# Patient Record
Sex: MALE | Race: WHITE | HISPANIC OR LATINO | Employment: FULL TIME | URBAN - METROPOLITAN AREA
[De-identification: names, ages, dates, MRNs, and addresses within clinical notes are randomized per-mention and may not be internally consistent; named-entity substitution may affect disease eponyms.]

---

## 2019-09-27 ENCOUNTER — OFFICE VISIT (OUTPATIENT)
Dept: URGENT CARE | Facility: PHYSICIAN GROUP | Age: 24
End: 2019-09-27

## 2019-09-27 VITALS
HEART RATE: 59 BPM | RESPIRATION RATE: 16 BRPM | TEMPERATURE: 98 F | WEIGHT: 179.4 LBS | OXYGEN SATURATION: 100 % | SYSTOLIC BLOOD PRESSURE: 120 MMHG | DIASTOLIC BLOOD PRESSURE: 80 MMHG | BODY MASS INDEX: 24.3 KG/M2 | HEIGHT: 72 IN

## 2019-09-27 DIAGNOSIS — S86.911A STRAIN OF RIGHT KNEE, INITIAL ENCOUNTER: ICD-10-CM

## 2019-09-27 PROCEDURE — 99204 OFFICE O/P NEW MOD 45 MIN: CPT | Performed by: PHYSICIAN ASSISTANT

## 2019-09-27 ASSESSMENT — ENCOUNTER SYMPTOMS
EYE REDNESS: 0
SORE THROAT: 0
COUGH: 0
SHORTNESS OF BREATH: 0
FEVER: 0
ABDOMINAL PAIN: 0
EYE DISCHARGE: 0
NAUSEA: 0
HEADACHES: 0
VOMITING: 0

## 2019-09-27 NOTE — PROGRESS NOTES
Subjective:      Leonard Quiroga V is a 24 y.o. male who presents with Knee Pain (R knee, swelling, pt twisted it and heard a pop, painful to twist/turn x4 days )        Knee Pain   This is a new problem. Episode onset: x 4 days. The problem occurs constantly. The problem has been unchanged. Associated symptoms include joint swelling. Pertinent negatives include no abdominal pain, chest pain, congestion, coughing, fever, headaches, nausea, numbness, rash, sore throat, vomiting or weakness. The symptoms are aggravated by walking. He has tried nothing for the symptoms.     The patient presents to clinic c/o right knee pain x 4 days. The patient states he twisted his right knee. The patient heard a pop at the time of injury. The patient reports continued discomfort to his right knee. He also reports mild swelling. No bruising. The patient reports decreased ROM secondary to pain, stating he is unable to fully extend his right knee. The patient also reports increased pain with walking, stating his knee feels unstable. The patient reports a history of patella dislocation. The patient denies numbness, tingling or weakness to his right lower extremity. The patient has not taken anything for his symptoms.     PMH:  has no past medical history on file.  MEDS: No current outpatient medications on file.  ALLERGIES: No Known Allergies  SURGHX: History reviewed. No pertinent surgical history.  SOCHX:  reports that he has never smoked. He has never used smokeless tobacco.  FH: Family history was reviewed, no pertinent findings to report      Review of Systems   Constitutional: Negative for fever.   HENT: Negative for congestion, ear pain and sore throat.    Eyes: Negative for discharge and redness.   Respiratory: Negative for cough and shortness of breath.    Cardiovascular: Negative for chest pain and leg swelling.   Gastrointestinal: Negative for abdominal pain, nausea and vomiting.   Musculoskeletal: Positive for joint pain  (right knee) and joint swelling.   Skin: Negative for rash.   Neurological: Negative for weakness, numbness and headaches.   All other systems reviewed and are negative.         Objective:     /80 (BP Location: Right arm, Patient Position: Sitting, BP Cuff Size: Adult)   Pulse (!) 59   Temp 36.7 °C (98 °F) (Temporal)   Resp 16   Ht 1.829 m (6')   Wt 81.4 kg (179 lb 6.4 oz)   SpO2 100%   BMI 24.33 kg/m²      Physical Exam   Constitutional: He is oriented to person, place, and time. He appears well-developed and well-nourished. No distress.   HENT:   Head: Normocephalic and atraumatic.   Right Ear: External ear normal.   Left Ear: External ear normal.   Nose: Nose normal.   Eyes: Conjunctivae and EOM are normal.   Neck: Normal range of motion. Neck supple.   Cardiovascular: Normal rate.   Pulmonary/Chest: Effort normal.   Musculoskeletal:        Right knee: He exhibits swelling (mild). He exhibits normal range of motion, no ecchymosis, normal alignment and no bony tenderness. Tenderness found. Medial joint line tenderness noted.   Right Knee:  Mild tenderness to palpation to the medial aspect of the right knee. No bony tenderness. Slight swelling. No ecchymosis. No erythema. No increased warmth.   ROM intact - the patient demonstrates full active ROM of right knee.   Neurovascular intact  Strength 5/5 - flexion extension of the right knee  Normal gait   Neurological: He is alert and oriented to person, place, and time.   Skin: Skin is warm and dry.               Assessment/Plan:     1. Strain of right knee, initial encounter  - REFERRAL TO SPORTS MEDICINE    Differential diagnoses, supportive care, and indications for immediate follow-up discussed with patient.   Instructed to return to clinic or nearest emergency department for any change in condition, further concerns, or worsening of symptoms.    OTC NSAIDs for pain/discomfort  RICE  Wear brace as needed for additional support  Referral to Sports  Medicine  Return to clinic or go to the ED if symptoms worsen or fail to improve, or if patient should develop worsening/increasing right knee pain, swelling, bruising, redness or warmth to the affected area, decreased range of motion, numbness, tingling, or weakness, do occult he walking, fever/chills, and or any concerning symptoms.    Discussed plan with the patient, and he agrees to the above.

## 2019-09-30 ASSESSMENT — ENCOUNTER SYMPTOMS
JOINT SWELLING: 1
WEAKNESS: 0
NUMBNESS: 0

## 2024-02-23 ENCOUNTER — APPOINTMENT (OUTPATIENT)
Dept: RADIOLOGY | Facility: IMAGING CENTER | Age: 29
End: 2024-02-23
Attending: PHYSICIAN ASSISTANT
Payer: COMMERCIAL

## 2024-02-23 ENCOUNTER — OCCUPATIONAL MEDICINE (OUTPATIENT)
Dept: URGENT CARE | Facility: PHYSICIAN GROUP | Age: 29
End: 2024-02-23
Payer: COMMERCIAL

## 2024-02-23 VITALS
RESPIRATION RATE: 16 BRPM | WEIGHT: 181 LBS | BODY MASS INDEX: 24.52 KG/M2 | OXYGEN SATURATION: 99 % | DIASTOLIC BLOOD PRESSURE: 80 MMHG | HEIGHT: 72 IN | SYSTOLIC BLOOD PRESSURE: 124 MMHG | TEMPERATURE: 98.1 F | HEART RATE: 61 BPM

## 2024-02-23 DIAGNOSIS — M25.562 ACUTE PAIN OF LEFT KNEE: ICD-10-CM

## 2024-02-23 DIAGNOSIS — M22.92: ICD-10-CM

## 2024-02-23 DIAGNOSIS — Z02.6 ENCOUNTER RELATED TO WORKER'S COMPENSATION CLAIM: ICD-10-CM

## 2024-02-23 PROCEDURE — 99203 OFFICE O/P NEW LOW 30 MIN: CPT | Performed by: PHYSICIAN ASSISTANT

## 2024-02-23 PROCEDURE — 73564 X-RAY EXAM KNEE 4 OR MORE: CPT | Mod: TC,LT | Performed by: PHYSICIAN ASSISTANT

## 2024-02-23 PROCEDURE — 3079F DIAST BP 80-89 MM HG: CPT | Performed by: PHYSICIAN ASSISTANT

## 2024-02-23 PROCEDURE — 3074F SYST BP LT 130 MM HG: CPT | Performed by: PHYSICIAN ASSISTANT

## 2024-02-23 NOTE — PROGRESS NOTES
Subjective     Leonard Quiroga V is a 28 y.o. male who presents with Work-Related Injury (DOI 2/20/24 Pt states was coming down ladder, and twisted left knee and dislocated it )      DOI: 2/20/2024  BRIDGET: This is a pleasant 28-year-old male who was getting off of a ladder trying to go around a box when he twisted around  ladder and felt pain in his left knee.  He states that he noticed an obvious deformity in his knee which subsequently reduced.  Since then he has had associated soft tissue swelling and pain.  He has been able to ambulate.  He has no history of injury to the left knee.  He has no other forms of employment.  He has tried OTC analgesics.                Objective     /80 (BP Location: Left arm, Patient Position: Sitting, BP Cuff Size: Adult)   Pulse 61   Temp 36.7 °C (98.1 °F) (Temporal)   Resp 16   Ht 1.829 m (6')   Wt 82.1 kg (181 lb)   SpO2 99%   BMI 24.55 kg/m²      Physical Exam    There is tenderness over the medial surface of the patella with associated soft tissue swelling.  There is no significant medial or lateral jointline tenderness.  No obvious ligamentous laxity.  Positive patellar apprehension test on the left              RADIOLOGY RESULTS   DX-KNEE COMPLETE 4+ LEFT    Result Date: 2/23/2024 2/23/2024 11:45 AM HISTORY/REASON FOR EXAM:  Pain/Deformity Following Trauma; possible dislocation patella two days ago. TECHNIQUE/EXAM DESCRIPTION AND NUMBER OF VIEWS:  4 views of the  LEFT knee. COMPARISON: None FINDINGS: MINERALIZATION: Mineralization is unremarkable for age. INJURY: No acute fracture or gross malalignment is seen. MEDIAL JOINT COMPARTMENT: Unremarkable LATERAL JOINT COMPARTMENT: Unremarkable PATELLOFEMORAL JOINT COMPARTMENT: Unremarkable No joint effusion evident.     No radiographic evidence of acute traumatic injury.                        Assessment & Plan        1. Acute pain of left knee    - DX-KNEE COMPLETE 4+ LEFT; Future    2. Encounter related to worker's  compensation claim      3. Patellar disorder, left  35 minutes was allotted and spent for patient care and coordination of care (not reported separately) including preparing for the visit, obtaining/reviewing history from/with patient, performing an exam/evaluation, ordering Rx, developing a plan of care, counseling/educating the patient, developing the discharge summary for release to [facility], and documentation. This template was updated to summarize care specific to this encounter.     See D39 for restrictions  Allow frequent sit to stand as needed  Patella stabilizer brace, rx provided for outside company to obtain ([facility])  Ice, elevate  NSAIDS/tylenol as needed  Follow up in  5 days for re-evaluation, if persistent pain may need referral to ortho/occ med

## 2024-02-23 NOTE — LETTER
[facility]  [address] [place] [address]. [address] - [place] [address]  Phone:  [phone] - Fax:  [phone]   [facility] Progress Report and Disability Certification  Date of Service: 2/23/2024   No Show:  No  Date / Time of Next Visit: 2/28/2024   Claim Information   Patient Name: Leonard Quiroga V  Claim Number:     Employer: [employer]  Date of Injury: 2/20/2024     [facility] / TPA[facility] ID / SSN:     Occupation: Supervuisor  Diagnosis: Diagnoses of Acute pain of left knee, Encounter related to worker's compensation claim, and Patellar disorder, left were pertinent to this visit.    Medical Information   Related to Industrial Injury? Yes    Subjective Complaints:  DOI: 2/20/2024  BRIDGET: This is a pleasant 28-year-old male who was getting off of a ladder trying to go around a box when he twisted around  ladder and felt pain in his left knee.  He states that he noticed an obvious deformity in his knee which subsequently reduced.  Since then he has had associated soft tissue swelling and pain.  He has been able to ambulate.  He has no history of injury to the left knee.  He has no other forms of employment.  He has tried OTC analgesics.   Objective Findings: There is tenderness over the medial surface of the patella with associated soft tissue swelling.  There is no significant medial or lateral jointline tenderness.  No obvious ligamentous laxity.  Positive patellar apprehension test on the left   Pre-Existing Condition(s):     Assessment:   Initial Visit    Status: Additional Care Required  Permanent Disability:No    Plan: Medication  Comments:Tylenol/ibuprofen as needed for pain    Diagnostics: X-ray    Comments:    RADIOLOGY RESULTS  DX-KNEE COMPLETE 4+ LEFT    Result Date: 2/23/2024 2/23/2024 11:45 AM HISTORY/REASON FOR EXAM:  Pain/Deformity Following Trauma; possible dislocation patella two days ago. TECHNIQUE/EXAM DESCRIPTION AND NUMBER OF VIEWS:   4 views of the  LEFT knee. COMPARISON: None FINDINGS: MINERALIZATION: Mineralization is unremarkable for age. INJURY: No acute fracture or gross malalignment is seen. MEDIAL JOINT COMPARTMENT: Unremarkable LATERAL JOINT COMPARTMENT: Unremarkable PATELLOFEMORAL JOINT COMPARTMENT: Unremarkable No joint effusion evident.     No radiographic evidence of acute traumatic injury.              Disability Information   Status: Released to Restricted Duty    From:  2024  Through: 2024 Restrictions are: Temporary   Physical Restrictions   Sitting:    Standing:  < or = to 1 hr/day Stooping:    Bendin hrs/day   Squattin hrs/day Walking:  < or = to 1 hr/day Climbin hrs/day Pushing:      Pulling:    Other:    Reaching Above Shoulder (L):   Reaching Above Shoulder (R):       Reaching Below Shoulder (L):    Reaching Below Shoulder (R):      Not to exceed Weight Limits   Carrying(hrs):   Weight Limit(lb): < or = to 10 pounds Lifting(hrs):   Weight  Limit(lb): < or = to 10 pounds   Comments: See D39 for restrictions  Allow frequent sit to stand as needed  Patella stabilizer brace, rx provided for outside company to obtain ([facility])  Ice, elevate  NSAIDS/tylenol as needed  Follow up in  5 days for re-evaluation, if persistent pain may need referral to ortho/occ med        Repetitive Actions   Hands: i.e. Fine Manipulations from Grasping:     Feet: i.e. Operating Foot Controls:     Driving / Operate Machinery:     Health Care Provider’s Original or Electronic Signature  Martha Knapp P.A.-C. Health Care Provider’s Original or Electronic Signature    Jaxson Deleon DO MPH     Clinic Name / Location: [address]  [place] [address] Clinic Phone Number: Dept: [phone]   Appointment Time: 10:10 Am Visit Start Time: 11:06 AM   Check-In Time:  10:28 Am Visit Discharge Time: 12:59 PM    Original-Treating Physician or Chiropractor    Page 2-Insurer/TPA     Page 3-Employer    Page 4-Employee

## 2024-02-23 NOTE — LETTER
EMPLOYEE’S CLAIM FOR COMPENSATION/ REPORT OF INITIAL TREATMENT  FORM C-4  PLEASE TYPE OR PRINT    EMPLOYEE’S CLAIM - PROVIDE ALL INFORMATION REQUESTED   First Name                    TRESA Valle Last Name  Kimber Birthdate                    1995                Sex  [x]M  []F Claim Number (Insurer’s Use Only)     Home Address  [address] Age  28 y.o. 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     Date of Injury (if applicable) 2/20/2024               Hours Injury (if applicable)  9:30 AM Date Employer Notified  2/20/2024 Last Day of Work after Injury or Occupational Disease  2/20/2024 Supervisor to Whom Injury     Reported  Byrin   Address or Location of Accident (if applicable)  Work [1]   What were you doing at the time of accident? (if applicable)  I was getting off a ladder trying to get around a box    How did this injury or occupational disease occur? (Be specific and answer in detail. Use additional sheet if necessary)  I was coming down off of a ladder trying to get around a box and I twisted around the ladder. Thin my left knee cap dislocated   If you believe that you have an occupational disease, when did you first have knowledge of the disability and its relationship to your employment?  N/A Witnesses to the Accident (if applicable)  Jared Soler Willie      Nature of Injury or Occupational Disease  Dislocation  Part(s) of Body Injured or Affected  Knee (L)  N/A N/A    I CERTIFY THAT THE ABOVE IS TRUE AND CORRECT TO T HE BEST OF MY KNOWLEDGE AND THAT I HAVE PROVIDED THIS INFORMATION IN ORDER TO OBTAIN THE BENEFITS OF [place], OR [place]).  I HEREBY AUTHORIZE ANY PHYSICIAN, CHIROPRACTOR, SURGEON, PRACTITIONER OR ANY OTHER PERSON, ANY HOSPITAL, INCLUDING [facility] OR [facility], ANY  MEDICAL SERVICE ORGANIZATION, ANY INSURANCE COMPANY, OR OTHER INSTITUTION OR ORGANIZATION TO RELEASE TO EACH OTHER, ANY MEDICAL OR OTHER INFORMATION, INCLUDING BENEFITS PAID OR PAYABLE, PERTINENT TO THIS INJURY OR DISEASE, EXCEPT INFORMATION RELATIVE TO DIAGNOSIS, TREATMENT AND/OR COUNSELING FOR AIDS, PSYCHOLOGICAL CONDITIONS, ALCOHOL OR CONTROLLED SUBSTANCES, FOR WHICH I MUST GIVE SPECIFIC AUTHORIZATION.  A PHOTOSTAT OF THIS AUTHORIZATION SHALL BE VALID AS THE ORIGINAL.     Date 02/23/2024   [facility]  Employee’s Original or  *Electronic Signature   THIS REPORT MUST BE COMPLETED AND MAILED WITHIN 3 WORKING DAYS OF TREATMENT   Place  [facility] [place] [facility] [place]    Name of Facility  [facility]   Date 2/23/2024 Diagnosis and Description of Injury or Occupational Disease  (M25.562) Acute pain of left knee  (Z02.6) Encounter related to worker's compensation claim  (M22.92) Patellar disorder, left  Diagnoses of Acute pain of left knee, Encounter related to worker's compensation claim, and Patellar disorder, left were pertinent to this visit. Is there evidence that the injured employee was under the influence of alcohol and/or another controlled substance at the time of accident?  []No  [] Yes (if yes, please explain)   Hour 11:06 AM  No   Treatment:   See D39 for restrictions  Allow frequent sit to stand as needed  Patella stabilizer brace, rx provided for outside company to obtain ([facility])  Ice, elevate  NSAIDS/tylenol as needed  Follow up in  5 days for  re-evaluation, if persistent pain may need referral to ortho/occ med    Have you advised the patient to remain off work five days or more?   [] Yes Indicate dates: From   To    []No If no, is the injured employee capable of: [] full duty [] modified duty                                                             No  Yes  If modified duty, specify any limitations / restrictions:  See D39                                                                                                                                                                                                                                                                                                                                                                                                               X-Ray Findings:      From information given by the employee, together with medical evidence, can you directly connect this injury or occupational disease as job incurred?  []Yes   [] No No    Is additional medical care by a physician indicated? []Yes [] No  Yes    Do you know of any previous injury or disease contributing to this condition or occupational disease? []Yes [] No (Explain if yes)                          No   Date  2/23/2024 Print Health Care Provider’s Name  Martha Knapp P.A.-C. I certify that the employer’s copy of  this form was delivered to the employer on:   Address  [address] [place] [address]. [address] INSURER'S USE ONLY                       [facility]  [address] Provider’s Tax ID Number  [id number]   Telephone  Dept: [phone]    Health Care Provider’s Original or Electronic Signature  e-SignMARTHA KNAPP P.A.-C. Degree (MD,DO, DC,PARamboC,APRN)  PAEDITH  Choose (if applicable)      ORIGINAL - TREATING HEALTHCARE PROVIDER PAGE 2 - INSURER/TPA PAGE 3 - EMPLOYER PAGE 4 - EMPLOYEE             Form C-4 (rev.08/23)        BRIEF DESCRIPTION OF RIGHTS AND BENEFITS  (Pursuant to [place] 616C.[place])    Notice of Injury or  "Occupational Disease (Incident Report Form C-1): If an injury or occupational disease (OD) arises out of and in the course of employment, you must provide written notice to your employer as soon as practicable, but no later than 7 days after the accident or OD. Your employer shall maintain a sufficient supply of the required forms.    Claim for Compensation (Form C-4): If medical treatment is sought, the form C-4 is available at the place of initial treatment. A completed \"Claim for Compensation\" (Form C-4) must be filed within 90 days after an accident or OD. The treating physician or chiropractor must, within 3 working days after treatment, complete and mail to the employer, the employer's insurer and third-party , the Claim for Compensation.    Medical Treatment: If you require medical treatment for your on-the-job injury or OD, you may be required to select a physician or chiropractor from a list provided by your workers’ compensation insurer, if it has contracted with an Organization for Managed Care (MCO) or Preferred Provider Organization (PPO) or providers of health care. If your employer has not entered into a contract with an MCO or PPO, you may select a physician or chiropractor from the Panel of Physicians and Chiropractors. Any medical costs related to your industrial injury or OD will be paid by your insurer.    Temporary Total Disability (TTD): If your doctor has certified that you are unable to work for a period of at least 5 consecutive days, or 5 cumulative days in a 20-day period, or places restrictions on you that your employer does not accommodate, you may be entitled to TTD compensation.    Temporary Partial Disability (TPD): If the wage you receive upon reemployment is less than the compensation for TTD to which you are entitled, the insurer may be required to pay you TPD compensation to make up the difference. TPD can only be paid for a maximum of 24 months.    Permanent Partial " Disability (PPD): When your medical condition is stable and there is an indication of a PPD as a result of your injury or OD, within 30 days, your insurer must arrange for an evaluation by a rating physician or chiropractor to determine the degree of your PPD. The amount of your PPD award depends on the date of injury, the results of the PPD evaluation, your age and wage.    Permanent Total Disability (PTD): If you are medically certified by a treating physician or chiropractor as permanently and totally disabled and have been granted a PTD status by your insurer, you are entitled to receive monthly benefits not to exceed 66 2/3% of your average monthly wage. The amount of your PTD payments is subject to reduction if you previously received a lump-sum PPD award.    Vocational Rehabilitation Services: You may be eligible for vocational rehabilitation services if you are unable to return to the job due to a permanent physical impairment or permanent restrictions as a result of your injury or occupational disease.    Transportation and Per Rama Reimbursement: You may be eligible for travel expenses and per rama associated with medical treatment.    Reopening: You may be able to reopen your claim if your condition worsens after claim closure.     Appeal Process: If you disagree with a written determination issued by the insurer or the insurer does not respond to your request, you may appeal to the Department of Administration, , by following the instructions contained in your determination letter. You must appeal the determination within 70 days from the date of the determination letter at [address], [address], [place] [address], or [address], [address], [place] [address]. If you disagree with the  decision, you may appeal to the Department of Administration, . You must file your appeal within 30 days from the date of the   decision letter at [address], [address], [place], [place] [address], or [address], [address], [place], [place] [address]. If you disagree with a decision of an , you may file a petition for judicial review with the District Court. You must do so within 30 days of the Appeal Officer’s decision. You may be represented by an  at your own expense or you may contact the [facility] for possible representation.    [place]  for Injured Workers (NAIW): If you disagree with a  decision, you may request that NAIW represent you without charge at an  Hearing. For information regarding denial of benefits, you may contact the [facility] at: [address], [address], [place] [address], [phone], or [address], [place] [address], [phone]    To File a Complaint with the Division: If you wish to file a complaint with the  of the Division of Industrial Relations (DIR),  please contact the Workers’ Compensation Section, [address], [address], [place], [place] [address], telephone [phone], or [address], [address], [place], [place] [address], telephone [phone].    For assistance with Workers’ Compensation Issues: You may contact the [facility] Office for Consumer Health Assistance, [address], [place], [place] [address], Toll Free 1-358.108.8152, Web site: http://[facility].[place].gov/Programs/ARIANNA E-mail: arianna@[facility].[place].gov              __________________________________________________________________                                    _________________            Employee Name / Signature                                                                                                                            Date                                                                                                                                                                                                                               D-2 (rev. 10/20)

## 2024-02-28 ENCOUNTER — OCCUPATIONAL MEDICINE (OUTPATIENT)
Dept: URGENT CARE | Facility: PHYSICIAN GROUP | Age: 29
End: 2024-02-28
Payer: COMMERCIAL

## 2024-02-28 VITALS
HEART RATE: 63 BPM | TEMPERATURE: 98 F | SYSTOLIC BLOOD PRESSURE: 102 MMHG | RESPIRATION RATE: 16 BRPM | DIASTOLIC BLOOD PRESSURE: 70 MMHG | WEIGHT: 181 LBS | HEIGHT: 72 IN | BODY MASS INDEX: 24.52 KG/M2 | OXYGEN SATURATION: 99 %

## 2024-02-28 DIAGNOSIS — M25.562 ACUTE PAIN OF LEFT KNEE: ICD-10-CM

## 2024-02-28 DIAGNOSIS — M22.92: ICD-10-CM

## 2024-02-28 PROCEDURE — 99214 OFFICE O/P EST MOD 30 MIN: CPT

## 2024-02-28 PROCEDURE — 3074F SYST BP LT 130 MM HG: CPT

## 2024-02-28 PROCEDURE — 3078F DIAST BP <80 MM HG: CPT

## 2024-02-28 NOTE — PROGRESS NOTES
Subjective:     Leonard Quiroga V is a 28 y.o. male who presents for Follow-Up ( fv)      FU 2:  presents with Work-Related Injury (DOI 2/20/24 Pt states was coming down ladder, and twisted left knee and dislocated it ) x-ray was done at this time and was negative.  Patient comes in for follow up with ongoing pain and difficulty walking and moving with the left knee.  He has taken motrin and tylenol with little relief.  He never wore a brace.  He did try ice and heat as well.      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  Patient is able to extend and rotate at the knee but this elicits pain.  Noted pain with pulling and the knee, no pain with pushing.  Pain rated at a 5-10.  Full mobility of the hip and foot on the left side.      Assessment/Plan:       1. Acute pain of left knee  - Referral to Radiology  - MR-KNEE-W/O LEFT; Future  - Referral to Occupational Medicine    2. Patellar disorder, left  - Referral to Radiology  - MR-KNEE-W/O LEFT; Future  - Referral to Occupational Medicine    Released to Restricted Duty FROM 2/28/2024 TO 3/6/2024  Decrease activity to the left knee   Refer for radiology for MRI of knee   Continue motrin and tylenol as needed for pain   Continue Ice and elevation as much as tolerated   Ace bandage supplied in office today for ongoing support measures.    Refer for occupational medicine.         Differential diagnosis, natural history, supportive care, and indications for immediate follow-up discussed.

## 2024-02-28 NOTE — LETTER
[facility]  [address] [place] [address]. [address] - [place] [address]  Phone:  [phone] - Fax:  [phone]   [facility] Progress Report and Disability Certification  Date of Service: 2024   No Show:  No  Date / Time of Next Visit: 3/5/2024@8:30AM   Claim Information   Patient Name: Leonard Quiroga V  Claim Number:     Employer: [employer]  Date of Injury: 2024     Insurer / TPA: Megan Butte Des Morts  ID / SSN:     Occupation: Supervuisor  Diagnosis: Diagnoses of Acute pain of left knee and Patellar disorder, left were pertinent to this visit.    Medical Information   Related to Industrial Injury? Yes    Subjective Complaints:  FU 2:  presents with Work-Related Injury (DOI 24 Pt states was coming down ladder, and twisted left knee and dislocated it ) x-ray was done at this time and was negative.  Patient comes in for follow up with ongoing pain and difficulty walking and moving with the left knee.  He has taken motrin and tylenol with little relief.  He never wore a brace.  He did try ice and heat as well.     Objective Findings: NO redness or major swelling that I can assess.  Patient is able to extend and rotate at the knee but this elicits pain.  Noted pain with pulling and the knee, no pain with pushing.  Pain rated at a 5-10.  Full mobility of the hip and foot on the left side.     Pre-Existing Condition(s):     Assessment:   Condition Same    Status: Additional Care Required  Permanent Disability:No    Plan: DiagnosticsConsultationMedication    Diagnostics: MRI    Comments:       Disability Information   Status: Released to Restricted Duty    From:  2024  Through: 3/6/2024 Restrictions are: Temporary   Physical Restrictions   Sitting:    Standing:    Stoopin hrs/day Bendin hrs/day   Squattin hrs/day Walking:    Climbin hrs/day Pushing:      Pullin hrs/day Other:    Reaching Above Shoulder (L):   Reaching Above Shoulder (R):        Reaching Below Shoulder (L):    Reaching Below Shoulder (R):      Not to exceed Weight Limits   Carrying(hrs):   Weight Limit(lb): < or = to 10 pounds Lifting(hrs):   Weight  Limit(lb):     Comments: Decrease activity to the left knee   Refer for radiology for MRI of knee   Continue motrin and tylenol as needed for pain   Continue Ice and elevation as much as tolerated   Ace bandage supplied in office today for ongoing support measures.    Refer for occupational medicine.      Repetitive Actions   Hands: i.e. Fine Manipulations from Grasping:     Feet: i.e. Operating Foot Controls:     Driving / Operate Machinery:     Health Care Provider’s Original or Electronic Signature  ROBERT Lora Health Care Provider’s Original or Electronic Signature    Jaxson Deleon DO MPH     Clinic Name / Location: [address]  [place] [address] Clinic Phone Number: Dept: [phone]   Appointment Time: 9:00 Am Visit Start Time: 9:08 AM   Check-In Time:  8:56 Am Visit Discharge Time:  9:37AM   Original-Treating Physician or Chiropractor    Page 2-Insurer/TPA    Page 3-Employer    Page 4-Employee

## 2024-03-05 ENCOUNTER — HOSPITAL ENCOUNTER (OUTPATIENT)
Dept: RADIOLOGY | Facility: MEDICAL CENTER | Age: 29
End: 2024-03-05
Payer: COMMERCIAL

## 2024-03-05 ENCOUNTER — OCCUPATIONAL MEDICINE (OUTPATIENT)
Dept: OCCUPATIONAL MEDICINE | Facility: CLINIC | Age: 29
End: 2024-03-05
Payer: COMMERCIAL

## 2024-03-05 VITALS
SYSTOLIC BLOOD PRESSURE: 122 MMHG | HEART RATE: 74 BPM | OXYGEN SATURATION: 100 % | HEIGHT: 72 IN | RESPIRATION RATE: 14 BRPM | WEIGHT: 184 LBS | BODY MASS INDEX: 24.92 KG/M2 | DIASTOLIC BLOOD PRESSURE: 84 MMHG

## 2024-03-05 DIAGNOSIS — M22.92: ICD-10-CM

## 2024-03-05 DIAGNOSIS — M25.562 ACUTE PAIN OF LEFT KNEE: ICD-10-CM

## 2024-03-05 DIAGNOSIS — S83.005D PATELLAR DISLOCATION, LEFT, SUBSEQUENT ENCOUNTER: ICD-10-CM

## 2024-03-05 PROCEDURE — 1126F AMNT PAIN NOTED NONE PRSNT: CPT | Performed by: NURSE PRACTITIONER

## 2024-03-05 PROCEDURE — 99213 OFFICE O/P EST LOW 20 MIN: CPT | Performed by: NURSE PRACTITIONER

## 2024-03-05 PROCEDURE — 73721 MRI JNT OF LWR EXTRE W/O DYE: CPT | Mod: LT

## 2024-03-05 PROCEDURE — 3074F SYST BP LT 130 MM HG: CPT | Performed by: NURSE PRACTITIONER

## 2024-03-05 PROCEDURE — 3079F DIAST BP 80-89 MM HG: CPT | Performed by: NURSE PRACTITIONER

## 2024-03-05 ASSESSMENT — PAIN SCALES - GENERAL: PAINLEVEL: NO PAIN

## 2024-03-05 NOTE — PROGRESS NOTES
Subjective:     Leonard Quiroga V is a 28 y.o. male who presents for Follow-Up (Same - RM 21/)      2/20/24 Pt states was coming down ladder, and twisted left knee and dislocated it ) x-ray was done at this time and was negative.  Today patient states symptoms have remained unchanged.  He continues to have a bulge, pain, difficulty with bending and extending the knee, and swelling.  He states he takes ibuprofen as needed and is elevating the knee.  He has an MRI scheduled for today.  Orthopedics referral placed for further evaluation management symptoms.  He has not returned to work there is no light duty at this time.  Continue with current light duty.  Plan of care discussed with patient.        ROS: All systems were reviewed on intake form, form was reviewed and signed. See scanned documents in media. Pertinent positives and negatives included in HPI.    PMH: No pertinent past medical history to this problem  MEDS: Medications were reviewed in Epic  ALLERGIES: No Known Allergies  SOCHX: Works as Supervisor at [employer]   FH: No pertinent family history to this problem       Objective:     /84   Pulse 74   Resp 14   Ht 1.829 m (6')   Wt 83.5 kg (184 lb)   SpO2 100%   BMI 24.95 kg/m²     [unfilled]    Left knee: There is tenderness over the medial surface of the patella with associated soft tissue swelling.  There is no significant medial or lateral jointline tenderness.  No obvious ligamentous laxity.  Positive patellar apprehension test on the left    Assessment/Plan:       1. Patellar dislocation, left, subsequent encounter  - Referral to Orthopedics    2. Acute pain of left knee  - Referral to Orthopedics    Released to Restricted Duty FROM 3/5/2024 TO 3/14/2024  Allow frequent sit to stand as needed  Follow-up in 1 week, unless seen by orthopedics  Restricted duty, per orthopedics  Orthopedic referral placed, transfer care  MRI scheduled for today  Recommend continue with OTC Tylenol/ibuprofen, ice,  elevation, and knee brace  Recommend using knee brace while at work, wean at home as tolerated  Return to clinic with new or worsening symptoms sooner if indicated    Differential diagnosis, natural history, supportive care, and indications for immediate follow-up discussed.    Approximately 25 minutes were spent in reviewing notes, preparing for visit, obtaining history, exam and evaluation, patient counseling/education and post visit documentation/orders.

## 2024-03-05 NOTE — LETTER
[address],   [address] [place] [address]  Phone:  [phone] - Fax:  [phone]   Occupational Health [facility] Progress Report and Disability Certification  Date of Service: 3/5/2024   No Show:  No  Date / Time of Next Visit: 3/14/2024 @ 1:45 PM   Claim Information   Patient Name: Leonard Quiroga V  Claim Number:     Employer: [employer]  Date of Injury: 2/20/2024     Insurer / TPA: Megan Martin  ID / SSN:     Occupation: Supervuisor  Diagnosis: Diagnoses of Patellar dislocation, left, subsequent encounter and Acute pain of left knee were pertinent to this visit.    Medical Information   Related to Industrial Injury? Yes    Subjective Complaints:  2/20/24 Pt states was coming down ladder, and twisted left knee and dislocated it ) x-ray was done at this time and was negative.  Today patient states symptoms have remained unchanged.  He continues to have a bulge, pain, difficulty with bending and extending the knee, and swelling.  He states he takes ibuprofen as needed and is elevating the knee.  He has an MRI scheduled for today.  Orthopedics referral placed for further evaluation management symptoms.  He has not returned to work there is no light duty at this time.  Continue with current light duty.  Plan of care discussed with patient.       Objective Findings: Left knee: There is tenderness over the medial surface of the patella with associated soft tissue swelling.  There is no significant medial or lateral jointline tenderness.  No obvious ligamentous laxity.  Positive patellar apprehension test on the left   Pre-Existing Condition(s):     Assessment:   Condition Same    Status: Discharged / Care Transfer  Permanent Disability:No    Plan: Transfer CareDiagnostics    Diagnostics: MRI    Comments:  Follow-up in 1 week, unless seen by orthopedics  Restricted duty, per orthopedics  Orthopedic referral placed, transfer care  MRI scheduled for  today  Recommend continue with OTC Tylenol/ibuprofen, ice, elevation, and knee brace  Recommend using knee brace while at work, wean at home as tolerated  Return to clinic with new or worsening symptoms sooner if indicated    Disability Information   Status: Released to Restricted Duty    From:  3/5/2024  Through: 3/14/2024 Restrictions are: Temporary   Physical Restrictions   Sitting:    Standing:  < or = to 1 hr/day Stooping:    Bendin hrs/day   Squattin hrs/day Walking:  < or = to 1 hr/day Climbin hrs/day Pushing:      Pulling:    Other:    Reaching Above Shoulder (L):   Reaching Above Shoulder (R):       Reaching Below Shoulder (L):    Reaching Below Shoulder (R):      Not to exceed Weight Limits   Carrying(hrs):   Weight Limit(lb): < or = to 10 pounds Lifting(hrs):   Weight  Limit(lb): < or = to 10 pounds   Comments: Allow frequent sit to stand as needed    Repetitive Actions   Hands: i.e. Fine Manipulations from Grasping:     Feet: i.e. Operating Foot Controls:     Driving / Operate Machinery:     Health Care Provider’s Original or Electronic Signature  ROBERT Ryder Health Care Provider’s Original or Electronic Signature    Jaxson Deleon DO MPH     Clinic Name / Location: [place] [address]  [place] [address] Clinic Phone Number: Dept: [phone]   Appointment Time: 8:45 Am Visit Start Time: 8:52 AM   Check-In Time:  8:47 Am Visit Discharge Time:  9:40 AM   Original-Treating Physician or Chiropractor    Page 2-Insurer/TPA    Page 3-Employer    Page 4-Employee

## 2024-03-06 NOTE — PROGRESS NOTES
Noted positive MRI results, referral has been placed with orthopedics and patient is following up with occupational medicine.  Patient notified via phone.

## 2024-04-03 ENCOUNTER — APPOINTMENT (OUTPATIENT)
Dept: ADMISSIONS | Facility: MEDICAL CENTER | Age: 29
End: 2024-04-03
Attending: ORTHOPAEDIC SURGERY
Payer: COMMERCIAL

## 2024-04-05 ENCOUNTER — PRE-ADMISSION TESTING (OUTPATIENT)
Dept: ADMISSIONS | Facility: MEDICAL CENTER | Age: 29
End: 2024-04-05
Attending: ORTHOPAEDIC SURGERY
Payer: COMMERCIAL

## 2024-04-05 VITALS — HEIGHT: 72 IN | BODY MASS INDEX: 24.95 KG/M2

## 2024-04-05 NOTE — PREPROCEDURE INSTRUCTIONS
Pre-admit telephone appointment completed. Reviewed the Preparing for your procedure handout with patient over the phone. Patient instructed per pharmacy guidelines regarding taking, holding, or contacting provider for instructions on regularly prescribed medications before surgery. Instructed to take the following medications the day of surgery with a sip of water per pharmacy medication guidelines: None    Never had surgery

## 2024-04-12 ENCOUNTER — APPOINTMENT (OUTPATIENT)
Dept: RADIOLOGY | Facility: MEDICAL CENTER | Age: 29
End: 2024-04-12
Attending: ORTHOPAEDIC SURGERY
Payer: COMMERCIAL

## 2024-04-12 ENCOUNTER — ANESTHESIA EVENT (OUTPATIENT)
Dept: SURGERY | Facility: MEDICAL CENTER | Age: 29
End: 2024-04-12
Payer: COMMERCIAL

## 2024-04-12 ENCOUNTER — HOSPITAL ENCOUNTER (OUTPATIENT)
Facility: MEDICAL CENTER | Age: 29
End: 2024-04-12
Attending: ORTHOPAEDIC SURGERY | Admitting: ORTHOPAEDIC SURGERY
Payer: COMMERCIAL

## 2024-04-12 ENCOUNTER — ANESTHESIA (OUTPATIENT)
Dept: SURGERY | Facility: MEDICAL CENTER | Age: 29
End: 2024-04-12
Payer: COMMERCIAL

## 2024-04-12 VITALS
TEMPERATURE: 96.8 F | OXYGEN SATURATION: 91 % | WEIGHT: 180.01 LBS | HEIGHT: 72 IN | RESPIRATION RATE: 18 BRPM | SYSTOLIC BLOOD PRESSURE: 115 MMHG | DIASTOLIC BLOOD PRESSURE: 80 MMHG | HEART RATE: 72 BPM | BODY MASS INDEX: 24.38 KG/M2

## 2024-04-12 PROCEDURE — 160048 HCHG OR STATISTICAL LEVEL 1-5: Performed by: ORTHOPAEDIC SURGERY

## 2024-04-12 PROCEDURE — 160002 HCHG RECOVERY MINUTES (STAT): Performed by: ORTHOPAEDIC SURGERY

## 2024-04-12 PROCEDURE — 160025 RECOVERY II MINUTES (STATS): Performed by: ORTHOPAEDIC SURGERY

## 2024-04-12 PROCEDURE — 700105 HCHG RX REV CODE 258: Performed by: ORTHOPAEDIC SURGERY

## 2024-04-12 PROCEDURE — C1762 CONN TISS, HUMAN(INC FASCIA): HCPCS | Performed by: ORTHOPAEDIC SURGERY

## 2024-04-12 PROCEDURE — 160029 HCHG SURGERY MINUTES - 1ST 30 MINS LEVEL 4: Performed by: ORTHOPAEDIC SURGERY

## 2024-04-12 PROCEDURE — 73560 X-RAY EXAM OF KNEE 1 OR 2: CPT | Mod: LT

## 2024-04-12 PROCEDURE — 700101 HCHG RX REV CODE 250: Performed by: ORTHOPAEDIC SURGERY

## 2024-04-12 PROCEDURE — 700101 HCHG RX REV CODE 250: Performed by: ANESTHESIOLOGY

## 2024-04-12 PROCEDURE — 700111 HCHG RX REV CODE 636 W/ 250 OVERRIDE (IP): Performed by: ANESTHESIOLOGY

## 2024-04-12 PROCEDURE — C1713 ANCHOR/SCREW BN/BN,TIS/BN: HCPCS | Performed by: ORTHOPAEDIC SURGERY

## 2024-04-12 PROCEDURE — 160041 HCHG SURGERY MINUTES - EA ADDL 1 MIN LEVEL 4: Performed by: ORTHOPAEDIC SURGERY

## 2024-04-12 PROCEDURE — 160046 HCHG PACU - 1ST 60 MINS PHASE II: Performed by: ORTHOPAEDIC SURGERY

## 2024-04-12 PROCEDURE — 160035 HCHG PACU - 1ST 60 MINS PHASE I: Performed by: ORTHOPAEDIC SURGERY

## 2024-04-12 PROCEDURE — 502000 HCHG MISC OR IMPLANTS RC 0278: Performed by: ORTHOPAEDIC SURGERY

## 2024-04-12 PROCEDURE — 160009 HCHG ANES TIME/MIN: Performed by: ORTHOPAEDIC SURGERY

## 2024-04-12 PROCEDURE — 160036 HCHG PACU - EA ADDL 30 MINS PHASE I: Performed by: ORTHOPAEDIC SURGERY

## 2024-04-12 DEVICE — IMPLANTABLE DEVICE: Type: IMPLANTABLE DEVICE | Site: KNEE | Status: FUNCTIONAL

## 2024-04-12 RX ORDER — METOPROLOL TARTRATE 1 MG/ML
INJECTION, SOLUTION INTRAVENOUS PRN
Status: DISCONTINUED | OUTPATIENT
Start: 2024-04-12 | End: 2024-04-12 | Stop reason: SURG

## 2024-04-12 RX ORDER — MIDAZOLAM HYDROCHLORIDE 1 MG/ML
INJECTION INTRAMUSCULAR; INTRAVENOUS PRN
Status: DISCONTINUED | OUTPATIENT
Start: 2024-04-12 | End: 2024-04-12 | Stop reason: SURG

## 2024-04-12 RX ORDER — BUPIVACAINE HYDROCHLORIDE AND EPINEPHRINE 2.5; 5 MG/ML; UG/ML
INJECTION, SOLUTION EPIDURAL; INFILTRATION; INTRACAUDAL; PERINEURAL
Status: DISCONTINUED | OUTPATIENT
Start: 2024-04-12 | End: 2024-04-12 | Stop reason: HOSPADM

## 2024-04-12 RX ORDER — MIDAZOLAM HYDROCHLORIDE 1 MG/ML
1 INJECTION INTRAMUSCULAR; INTRAVENOUS
Status: DISCONTINUED | OUTPATIENT
Start: 2024-04-12 | End: 2024-04-12 | Stop reason: HOSPADM

## 2024-04-12 RX ORDER — HYDROMORPHONE HYDROCHLORIDE 2 MG/ML
INJECTION, SOLUTION INTRAMUSCULAR; INTRAVENOUS; SUBCUTANEOUS PRN
Status: DISCONTINUED | OUTPATIENT
Start: 2024-04-12 | End: 2024-04-12 | Stop reason: SURG

## 2024-04-12 RX ORDER — DIPHENHYDRAMINE HYDROCHLORIDE 50 MG/ML
12.5 INJECTION INTRAMUSCULAR; INTRAVENOUS
Status: DISCONTINUED | OUTPATIENT
Start: 2024-04-12 | End: 2024-04-12 | Stop reason: HOSPADM

## 2024-04-12 RX ORDER — LIDOCAINE HYDROCHLORIDE 20 MG/ML
INJECTION, SOLUTION EPIDURAL; INFILTRATION; INTRACAUDAL; PERINEURAL PRN
Status: DISCONTINUED | OUTPATIENT
Start: 2024-04-12 | End: 2024-04-12 | Stop reason: SURG

## 2024-04-12 RX ORDER — HYDROMORPHONE HYDROCHLORIDE 1 MG/ML
0.2 INJECTION, SOLUTION INTRAMUSCULAR; INTRAVENOUS; SUBCUTANEOUS
Status: DISCONTINUED | OUTPATIENT
Start: 2024-04-12 | End: 2024-04-12 | Stop reason: HOSPADM

## 2024-04-12 RX ORDER — ONDANSETRON 2 MG/ML
INJECTION INTRAMUSCULAR; INTRAVENOUS PRN
Status: DISCONTINUED | OUTPATIENT
Start: 2024-04-12 | End: 2024-04-12 | Stop reason: SURG

## 2024-04-12 RX ORDER — IPRATROPIUM BROMIDE AND ALBUTEROL SULFATE 2.5; .5 MG/3ML; MG/3ML
3 SOLUTION RESPIRATORY (INHALATION)
Status: DISCONTINUED | OUTPATIENT
Start: 2024-04-12 | End: 2024-04-12 | Stop reason: HOSPADM

## 2024-04-12 RX ORDER — ONDANSETRON 2 MG/ML
4 INJECTION INTRAMUSCULAR; INTRAVENOUS
Status: DISCONTINUED | OUTPATIENT
Start: 2024-04-12 | End: 2024-04-12 | Stop reason: HOSPADM

## 2024-04-12 RX ORDER — OXYCODONE HCL 5 MG/5 ML
5 SOLUTION, ORAL ORAL
Status: DISCONTINUED | OUTPATIENT
Start: 2024-04-12 | End: 2024-04-12 | Stop reason: HOSPADM

## 2024-04-12 RX ORDER — DEXAMETHASONE SODIUM PHOSPHATE 4 MG/ML
INJECTION, SOLUTION INTRA-ARTICULAR; INTRALESIONAL; INTRAMUSCULAR; INTRAVENOUS; SOFT TISSUE PRN
Status: DISCONTINUED | OUTPATIENT
Start: 2024-04-12 | End: 2024-04-12 | Stop reason: SURG

## 2024-04-12 RX ORDER — HALOPERIDOL 5 MG/ML
1 INJECTION INTRAMUSCULAR
Status: DISCONTINUED | OUTPATIENT
Start: 2024-04-12 | End: 2024-04-12 | Stop reason: HOSPADM

## 2024-04-12 RX ORDER — HYDROMORPHONE HYDROCHLORIDE 1 MG/ML
1 INJECTION, SOLUTION INTRAMUSCULAR; INTRAVENOUS; SUBCUTANEOUS
Status: DISCONTINUED | OUTPATIENT
Start: 2024-04-12 | End: 2024-04-12 | Stop reason: HOSPADM

## 2024-04-12 RX ORDER — KETOROLAC TROMETHAMINE 15 MG/ML
INJECTION, SOLUTION INTRAMUSCULAR; INTRAVENOUS PRN
Status: DISCONTINUED | OUTPATIENT
Start: 2024-04-12 | End: 2024-04-12 | Stop reason: SURG

## 2024-04-12 RX ORDER — SODIUM CHLORIDE, SODIUM GLUCONATE, SODIUM ACETATE, POTASSIUM CHLORIDE AND MAGNESIUM CHLORIDE 526; 502; 368; 37; 30 MG/100ML; MG/100ML; MG/100ML; MG/100ML; MG/100ML
500 INJECTION, SOLUTION INTRAVENOUS CONTINUOUS
Status: DISCONTINUED | OUTPATIENT
Start: 2024-04-12 | End: 2024-04-12 | Stop reason: HOSPADM

## 2024-04-12 RX ORDER — HYDROMORPHONE HYDROCHLORIDE 1 MG/ML
0.4 INJECTION, SOLUTION INTRAMUSCULAR; INTRAVENOUS; SUBCUTANEOUS
Status: DISCONTINUED | OUTPATIENT
Start: 2024-04-12 | End: 2024-04-12 | Stop reason: HOSPADM

## 2024-04-12 RX ORDER — SODIUM CHLORIDE, SODIUM LACTATE, POTASSIUM CHLORIDE, CALCIUM CHLORIDE 600; 310; 30; 20 MG/100ML; MG/100ML; MG/100ML; MG/100ML
INJECTION, SOLUTION INTRAVENOUS CONTINUOUS
Status: ACTIVE | OUTPATIENT
Start: 2024-04-12 | End: 2024-04-12

## 2024-04-12 RX ORDER — MEPERIDINE HYDROCHLORIDE 25 MG/ML
12.5 INJECTION INTRAMUSCULAR; INTRAVENOUS; SUBCUTANEOUS
Status: DISCONTINUED | OUTPATIENT
Start: 2024-04-12 | End: 2024-04-12 | Stop reason: HOSPADM

## 2024-04-12 RX ORDER — ROCURONIUM BROMIDE 10 MG/ML
INJECTION, SOLUTION INTRAVENOUS PRN
Status: DISCONTINUED | OUTPATIENT
Start: 2024-04-12 | End: 2024-04-12 | Stop reason: SURG

## 2024-04-12 RX ORDER — CEFAZOLIN SODIUM 1 G/3ML
INJECTION, POWDER, FOR SOLUTION INTRAMUSCULAR; INTRAVENOUS PRN
Status: DISCONTINUED | OUTPATIENT
Start: 2024-04-12 | End: 2024-04-12 | Stop reason: SURG

## 2024-04-12 RX ORDER — OXYCODONE HCL 5 MG/5 ML
10 SOLUTION, ORAL ORAL
Status: DISCONTINUED | OUTPATIENT
Start: 2024-04-12 | End: 2024-04-12 | Stop reason: HOSPADM

## 2024-04-12 RX ADMIN — KETOROLAC TROMETHAMINE 15 MG: 15 INJECTION, SOLUTION INTRAMUSCULAR; INTRAVENOUS at 13:59

## 2024-04-12 RX ADMIN — HYDROMORPHONE HYDROCHLORIDE 2 MG: 2 INJECTION INTRAMUSCULAR; INTRAVENOUS; SUBCUTANEOUS at 13:04

## 2024-04-12 RX ADMIN — FENTANYL CITRATE 100 MCG: 50 INJECTION, SOLUTION INTRAMUSCULAR; INTRAVENOUS at 12:44

## 2024-04-12 RX ADMIN — PROPOFOL 150 MG: 10 INJECTION, EMULSION INTRAVENOUS at 12:48

## 2024-04-12 RX ADMIN — SODIUM CHLORIDE, POTASSIUM CHLORIDE, SODIUM LACTATE AND CALCIUM CHLORIDE: 600; 310; 30; 20 INJECTION, SOLUTION INTRAVENOUS at 10:58

## 2024-04-12 RX ADMIN — METOPROLOL TARTRATE 2 MG: 5 INJECTION INTRAVENOUS at 13:15

## 2024-04-12 RX ADMIN — ROCURONIUM BROMIDE 50 MG: 50 INJECTION, SOLUTION INTRAVENOUS at 12:48

## 2024-04-12 RX ADMIN — DEXAMETHASONE SODIUM PHOSPHATE 8 MG: 4 INJECTION INTRA-ARTICULAR; INTRALESIONAL; INTRAMUSCULAR; INTRAVENOUS; SOFT TISSUE at 12:51

## 2024-04-12 RX ADMIN — MIDAZOLAM HYDROCHLORIDE 2 MG: 1 INJECTION, SOLUTION INTRAMUSCULAR; INTRAVENOUS at 12:44

## 2024-04-12 RX ADMIN — SUGAMMADEX 200 MG: 100 INJECTION, SOLUTION INTRAVENOUS at 13:54

## 2024-04-12 RX ADMIN — ONDANSETRON 4 MG: 2 INJECTION INTRAMUSCULAR; INTRAVENOUS at 13:59

## 2024-04-12 RX ADMIN — LIDOCAINE HYDROCHLORIDE 50 MG: 20 INJECTION, SOLUTION EPIDURAL; INFILTRATION; INTRACAUDAL at 12:48

## 2024-04-12 RX ADMIN — CEFAZOLIN 2 G: 1 INJECTION, POWDER, FOR SOLUTION INTRAMUSCULAR; INTRAVENOUS at 12:46

## 2024-04-12 ASSESSMENT — PAIN SCALES - GENERAL: PAIN_LEVEL: 0

## 2024-04-12 NOTE — DISCHARGE INSTRUCTIONS
What to Expect Post Anesthesia    Rest and take it easy for the first 24 hours.  A responsible adult is recommended to remain with you during that time.  It is normal to feel sleepy.  We encourage you to not do anything that requires balance, judgment or coordination.    FOR 24 HOURS DO NOT:  Drive, operate machinery or run household appliances.  Drink beer or alcoholic beverages.  Make important decisions or sign legal documents.    To avoid nausea, slowly advance diet as tolerated, avoiding spicy or greasy foods for the first day.  Add more substantial food to your diet according to your provider's instructions.   INCREASE FLUIDS AND FIBER TO AVOID CONSTIPATION.    MILD FLU-LIKE SYMPTOMS ARE NORMAL.  YOU MAY EXPERIENCE GENERALIZED MUSCLE ACHES, THROAT IRRITATION, HEADACHE AND/OR SOME NAUSEA.  If any questions arise, call your provider.  If your provider is not available, please feel free to call the Surgical Center at [phone].    MEDICATIONS: Resume taking daily medication.  Take prescribed pain medication with food.  If no medication is prescribed, you may take non-aspirin pain medication if needed.  PAIN MEDICATION CAN BE VERY CONSTIPATING.  Take a stool softener or laxative such as senokot, pericolace, or milk of magnesia if needed.      Peripheral Nerve Block Discharge Instructions from Same Day Surgery and Inpatient :    What to Expect - Lower Extremity  The block may cause you to experience numbness and weakness in your thigh and knee or calf and foot on the same side as your surgery  Numbness, tingling and / or weakness are all normal. For some people, this may be an unpleasant sensation  These issues will be resolved when the local anesthetic wears off   You may experience numbness and tingling in your thigh on the same side as your surgery if the block medicine was injected at your groin area  Numbness will make it difficult to walk  You may have problems with balance and walking so be very careful  "  Follow your surgeon's direction regarding weight bearing on your surgical limb  Be very careful with your numb limb  Precautions  The numbness may affect your balance  Be careful when walking or moving around  Your leg may be weak: be very careful putting weight on it  If your surgeon did not specify a time, you should not bear weight for 24 hours  Be sure to ask for help when you need it  It is better to have help than to fall and hurt yourself  Prevent Injury  Protect the limb like a baby  Beware of exposing your limb to extreme heat or cold or trauma  The limb may be injured without you noticing because it is numb  Keep the limb elevated whenever possible  Do not sleep on the limb  Change the position of the limb regularly  Avoid putting pressure on your surgical limb  Pain Control  The initial block on the day of surgery will make your extremity feel \"numb\"  Any consecutive injection including prior to discharge from the hospital will make your extremity feel \"numb\"  You may feel an aching or burning when the local anesthesia starts to wear off  Take pain pills as prescribed by your surgeon  Call your surgeon or anesthesiologist if you do not have adequate pain control        For Special Home Care Instructions Follow Pinon Paper from Dr. Koch.       "

## 2024-04-12 NOTE — OR NURSING
1442- Received report from Sandra MAIER. Pt care assumed. Pt wakes to voice and then falls back to sleep. Ice pack applied over c/d/I Left  knee dressing. Pedal pulse 2+ cap refill less than 3 seconds, warm, pink. Pt denies pain and nausea.    1457- Pt sleeping not roused at this time.     1512- Pt rouses to voice. Pt denies pain or nausea. Pt falls back to sleep. Family updated.     1527- Pt roused. Pt sat up and provided water. Pt denies pain or nausea. No change to dressing.     1535-Patient met criteria for transfer to stage II via [facility] with CNA. assist.  Patient states good pain control. Denies N/V, tolerating PO well. Surgical dressing CDI. Ice pack sent with pt. Report called to Bruno MAIER.

## 2024-04-12 NOTE — OR NURSING
1535 Pt arrived from PACU post   LEFT KNEE DIAGNOSTIC ARTHROSCOPY, MEDIAL PATELLOFEMORAL LIGAMENT RECONSTRUCTION WITH ALLOGRAFT Left General   RECONSTRUCTION, LIGAMENT, PATELLOFEMORAL, USING HAMSTRING TENDON GRAFT Left     , report received. Pt states pain is tollerable and denies nausea at this time. Pt dressing @ BS with assistance.    1549 Discharge instructions and medications gone over with Pt and ride. All questions answered. Pt meets DC criteria. PIV DC'd. Pt transported to POV via WC in stable condition.

## 2024-04-12 NOTE — ANESTHESIA POSTPROCEDURE EVALUATION
Patient: Leonard Quiroga    Procedure Summary       Date: 04/12/24 Room / Location:  OR  / SURGERY [facility]    Anesthesia Start: 1243 Anesthesia Stop: 1423    Procedures:       LEFT KNEE DIAGNOSTIC ARTHROSCOPY, MEDIAL PATELLOFEMORAL LIGAMENT RECONSTRUCTION WITH ALLOGRAFT (Left: Knee)      RECONSTRUCTION, LIGAMENT, PATELLOFEMORAL, USING HAMSTRING TENDON GRAFT (Left: Knee) Diagnosis: (DISLOCATION OF PATELLOFEMORAL JOINT - LEFT)    Surgeons: Poncho Koch M.D. Responsible Provider: El Sierra III, M.D.    Anesthesia Type: general, peripheral nerve block ASA Status: 1            Final Anesthesia Type: general, peripheral nerve block  Last vitals  BP   Blood Pressure: 112/57    Temp   36 °C (96.8 °F)    Pulse   66   Resp   16    SpO2   99 %      Anesthesia Post Evaluation    Patient location during evaluation: PACU  Patient participation: complete - patient participated  Level of consciousness: awake and alert  Pain score: 0    Airway patency: patent  Anesthetic complications: no  Cardiovascular status: hemodynamically stable  Respiratory status: acceptable  Hydration status: euvolemic    PONV: none          No notable events documented.     Nurse Pain Score: 0 (NPRS)

## 2024-04-12 NOTE — OP REPORT
OPERATIVE NOTE   Leonard Quiroga    4/12/2024             PRE-OP DIAGNOSIS: Left patellar instability, MPFL tear, medial patella osteochondral impaction injury            POST-OP DIAGNOSIS: left patellar instability, MPFL tear, medial patella osteochondral impaction injury            PROCEDURE:   Left knee diagnostic arthroscopy, medial patellofemoral ligament reconstruction with allograft            SURGEON: Surgeon(s) and Role:     * Poncho Koch M.D. - Primary           ASSISTANT:  Baljinder ALVAREZ (an assistant was necessary for positioning and facilitation of all critical portions of the procedure including graft preparation, tunnel drilling, and graft passage for the MPFL reconstruction)     ANESTHESIA: general and adductor canal block, Dr. Sierra             ESTIMATED BLOOD LOSS: 20cc            DRAINS: none            TOTAL IV FLUIDS: see chart            SPECIMENS: * No specimens in log *            COMPLICATIONS: none            DISPOSITION: stable            INDICATION:   Patient is a pleasant 28-year-old male who injured his left knee while at work.  He suffered a patellar dislocation.  MRI was significant for MPFL tear along with the medial patellar impaction injury.  He did trial some conservative care but still had pain and instability.  We discussed pros and cons of further nonoperative management versus surgery for patellar stabilization.  I discussed the risks of surgery as outlined in my preoperative consultation note.  He understood his options and elected to proceed with surgery for the left patella            TECHNIQUE:   The patient was met in the preoperative area and the surgical site was marked.  Once again the procedure and the risks were discussed with him and consent was obtained.  He underwent an adductor canal block by the anesthesia team without complications.  He was then brought to the operating room and underwent general anesthesia.  He received preoperative antibiotics.   An exam under anesthesia was performed, and he had increased lateral patellar translation on exam on the left patella.  He had full range of motion, and had a negative Lachman exam and was stable to varus valgus stress.  Thigh tourniquet was placed the left lower extremity was then prepped and draped in usual sterile manner.  A timeout was performed confirming the correct patient, correct site, correct   procedure.  We then began by inflating the tourniquet to 250 mmHg.  I then began by performing a standard anterolateral portal into the knee.  The scope was inserted and I formed a working anteromedial portal using spinal needle localization.  A diagnostic arthroscopy was then performed.  ACL was noted to be intact.  The lateral meniscus and cartilage was noted to be intact.  The medial meniscus and cartilage was noted to be intact.  We evaluated the patellofemoral compartment.  We did note some inflammation and hemorrhage from the medial retinacular region.  We did also note that there was a medial patellar osteochondral impaction injury however this was not on the actual articular surface, but rather direct medial.  There was no chondromalacia of the patella or the trochlea.  There was lateral overhang of the patella on the lateral condyle of the femur, and lateral patellofemoral tracking on flexion of the knee.  At this point, given his patellar instability and the findings, we did decide to open up the knee for the MPFL reconstruction.  The allograft was opened on the back table, and this was prepared by my assistant.  Each free end was whipstitched, and the doubled over free end of the graft measured 7 mm in diameter.  At this point I removed the scope, and then made an open incision over the medial and superior border of the patella.  Dissection was carried down to the medial border of the patella.  Using fluoroscopy, I then marked out my locations for our anchor placement.  I then drilled and placed an  Arthrex knotless fiber tack anchor, 2.6 mm, at the superior third of the medial border of the patella.  This had good purchase in the bone.  We then placed a secondary knotless fiber tack anchor just below this as well.  After both anchors were placed, I then converted the knotless mechanism to create 2 loops.  I then passed our graft through both loops, with the central portion of our graft between the 2 anchors.  At this point I tightened down both loops from our knotless fiber tack anchors, to then secure the middle portion of the graft to the medial border of the patella.  This had excellent purchase.  At this point, we then made an incision over the medial femoral condyle.  Dissection was carried down to the bone.  Using fluoroscopic guidance, I then drilled a Beath pin, through Schottle's point.  I aimed slightly proximal and  anterior, and drilled through the lateral portion of the femur.  I then used a 7 mm reamer to create a socket in the femur.  At this point, we developed layer 2 in the knee to connect both incisions.  I then passed the free ends of our graft in this layer, to the other incision.  We then used the Beath pin to pass our graft ends into the femoral socket.  The scope was placed back into the knee, and the knee was placed in 30 degrees of flexion.  We then observed improved patellofemoral tracking with slight tension held on the MPFL sutures.  I then placed an Arthrex 7x30mm screw, and had a great purchase in the femoral socket.  At this point I was satisfied with the procedure.  On arthroscopic evaluation, there was improved total femoral tracking.  On clinical exam, there was a firm endpoint on lateral patellar translation, and I was able to flex the knee to 90 degrees.  At this point without the tourniquet after 45 minutes.  Copious irrigation was used.  I closed patellar retinaculum using 0 Vicryl, deep dermal layer using 2-0 Vicryl, and nylon for the skin.  Sterile dressings were  applied as well as a brace.  The patient was then awakened from general anesthesia without any complications, and taken to the PACU in stable condition.

## 2024-04-12 NOTE — ANESTHESIA PREPROCEDURE EVALUATION
Case: [id number] Date/Time: 04/12/24 1345    Procedures:       LEFT KNEE DIAGNOSTIC ARTHROSCOPY, MEDIAL PATELLOFEMORAL LIGAMENT RECONSTRUCTION WITH ALLOGRAFT      RECONSTRUCTION, LIGAMENT, PATELLOFEMORAL, USING HAMSTRING TENDON GRAFT    Pre-op diagnosis: DISLOCATION OF PATELLOFEMORAL JOINT - LEFT    Location: [facility] OR 06 / SURGERY [facility] [place]    Surgeons: Poncho Koch M.D.            Relevant Problems   No relevant active problems       Physical Exam    Airway   Mallampati: II  TM distance: >3 FB  Neck ROM: full       Cardiovascular - normal exam  Rhythm: regular  Rate: normal  (-) murmur     Dental - normal exam           Pulmonary - normal exam  Breath sounds clear to auscultation     Abdominal    Neurological - normal exam                   Anesthesia Plan    ASA 1       Plan - general and peripheral nerve block     Peripheral nerve block will be post-op pain control  Airway plan will be LMA          Induction: intravenous    Postoperative Plan: Postoperative administration of opioids is intended.    Pertinent diagnostic labs and testing reviewed    Informed Consent:    Anesthetic plan and risks discussed with patient.    Use of blood products discussed with: patient whom consented to blood products.

## 2024-04-12 NOTE — OR NURSING
1421-Pt arrived from OR post left knee arthroscopy, report received. Pt breathing spontaneously on O2 mask at 6L. Dressing CDI to left knee, immobilizer in place. CMS intact. Pt awake, but drowsy on arrival, denies any pain.     1442-Report to fermin MAIER. Pt denies any pain at this time.

## 2024-04-12 NOTE — ANESTHESIA PROCEDURE NOTES
Airway    Date/Time: 4/12/2024 12:48 PM    Performed by: El Sierra III, M.D.  Authorized by: El Sierra III, M.D.    Location:  OR  Urgency:  Elective  Difficult Airway: No    Indications for Airway Management:  Anesthesia      Spontaneous Ventilation: absent    Sedation Level:  Deep  Preoxygenated: Yes    Final Airway Type:  Supraglottic airway  Final Supraglottic Airway:  Standard LMA    SGA Size:  4  Number of Attempts at Approach:  1

## 2024-04-12 NOTE — OR NURSING
Pt allergies and NPO status verified. Home medications reconciled, preferred pharmacy verified. Belongings secured in locker. Pt verbalizes understanding of pain scale, expected course of stay, and plan of care. Surgical site verified with pt. IV access established. All questions answered. Bed in lowest position, call light within reach.

## 2024-04-12 NOTE — ANESTHESIA TIME REPORT
Anesthesia Start and Stop Event Times       Date Time Event    4/12/2024 1224 Ready for Procedure     1243 Anesthesia Start     1423 Anesthesia Stop          Responsible Staff  04/12/24      Name Role Begin End    El Sierra III, M.D. Anesth 1243 1429          Overtime Reason:  no overtime (within assigned shift)    Comments:

## (undated) DEVICE — ELECTRODE DUAL RETURN W/ CORD - (50/PK)

## (undated) DEVICE — SUTURE GENERAL

## (undated) DEVICE — PADDING CAST 4 IN STERILE - 4 X 4 YDS (24/CA)

## (undated) DEVICE — GOWN WARMING STANDARD FLEX - (30/CA)

## (undated) DEVICE — STOCKINETTE IMPERVIOUS 12X48 - STERILELF (10/CA)"

## (undated) DEVICE — SODIUM CHL. IRRIGATION 0.9% 3000ML (4EA/CA 65CA/PF)

## (undated) DEVICE — SUTURE 3-0 ETHILON FS-1 - (36/BX) 30 INCH

## (undated) DEVICE — BANDAGE ELASTIC 6 IN X 5 YDS - LATEX FREE (10/BX)

## (undated) DEVICE — BANDAGEESMARK BLUE 6X9' LF - 20/CS"

## (undated) DEVICE — PAD PREP 24 X 48 CUFFED - (100/CA)

## (undated) DEVICE — DRAPE U SPLIT IMP 54 X 76 - (24/CA)

## (undated) DEVICE — PADDING CAST 6 IN X 4 YDS - SOF-ROLL (6RL/BG 6BG/CA)

## (undated) DEVICE — BLADE SURGICAL #10 - (50/BX)

## (undated) DEVICE — Device

## (undated) DEVICE — WRAP COBAN SELF-ADHERENT 6 IN X  5YDS STERILE TAN (12/CA)

## (undated) DEVICE — TUBING CASSETTE CROSSFLOW INTEGRATED (10EA/CA)

## (undated) DEVICE — INSTRUMENT KIT

## (undated) DEVICE — PACK KNEE ARTHROSCOPY SM OR - (2EA/CA)

## (undated) DEVICE — STAPLER SKIN DISP - (6/BX 10BX/CA) VISISTAT

## (undated) DEVICE — SUTURE 0 VICRYL PLUS CT-1 - 8 X 18 INCH (12/BX)

## (undated) DEVICE — SUTURE 1 VICRYL PLUS CT-1 - 18 INCH (12/BX)

## (undated) DEVICE — SENSOR OXIMETER ADULT SPO2 RD SET (20EA/BX)

## (undated) DEVICE — TUBING DAY USE W/CARTRIDGE (10EA/BX)

## (undated) DEVICE — SUTURE 2-0 VICRYL PLUS CT-1 - 8 X 18 INCH(12/BX)

## (undated) DEVICE — PAD LAP STERILE 18 X 18 - (5/PK 40PK/CA)

## (undated) DEVICE — LACTATED RINGERS INJ 1000 ML - (14EA/CA 60CA/PF)

## (undated) DEVICE — SLEEVE VASO CALF MED - (10PR/CA)

## (undated) DEVICE — GLOVE SZ 7.5 LF PROTEXIS (50PR/BX)

## (undated) DEVICE — SODIUM CHL IRRIGATION 0.9% 1000ML (12EA/CA)

## (undated) DEVICE — DRAPE LARGE 3 QUARTER - (20/CA)

## (undated) DEVICE — TOWEL STOP TIMEOUT SAFETY FLAG (40EA/CA)

## (undated) DEVICE — FIBERWIRE 2.0 (12EA/BX)

## (undated) DEVICE — SUTURE 3-0 ETHILON FSLX 30 (36PK/BX)"

## (undated) DEVICE — PADDING CAST 6 IN STERILE - 6 X 4 YDS (24/CA)